# Patient Record
Sex: MALE | Race: WHITE | NOT HISPANIC OR LATINO | Employment: FULL TIME | URBAN - METROPOLITAN AREA
[De-identification: names, ages, dates, MRNs, and addresses within clinical notes are randomized per-mention and may not be internally consistent; named-entity substitution may affect disease eponyms.]

---

## 2018-08-22 ENCOUNTER — OFFICE VISIT (OUTPATIENT)
Dept: NEPHROLOGY | Facility: CLINIC | Age: 33
End: 2018-08-22
Payer: COMMERCIAL

## 2018-08-22 VITALS
DIASTOLIC BLOOD PRESSURE: 104 MMHG | SYSTOLIC BLOOD PRESSURE: 138 MMHG | HEIGHT: 75 IN | HEART RATE: 80 BPM | RESPIRATION RATE: 16 BRPM | BODY MASS INDEX: 28.99 KG/M2 | WEIGHT: 233.13 LBS

## 2018-08-22 DIAGNOSIS — Q61.3 POLYCYSTIC KIDNEY DISEASE: ICD-10-CM

## 2018-08-22 DIAGNOSIS — N20.0 URIC ACID KIDNEY STONE: ICD-10-CM

## 2018-08-22 DIAGNOSIS — I15.1 HYPERTENSION SECONDARY TO OTHER RENAL DISORDERS: Primary | ICD-10-CM

## 2018-08-22 DIAGNOSIS — N28.89 HYPERTENSION SECONDARY TO OTHER RENAL DISORDERS: Primary | ICD-10-CM

## 2018-08-22 PROCEDURE — 99244 OFF/OP CNSLTJ NEW/EST MOD 40: CPT | Performed by: INTERNAL MEDICINE

## 2018-08-22 RX ORDER — IBUPROFEN 200 MG
400 TABLET ORAL EVERY 4 HOURS PRN
COMMUNITY
End: 2018-08-22

## 2018-08-22 RX ORDER — LISINOPRIL 5 MG/1
5 TABLET ORAL DAILY
Qty: 30 TABLET | Refills: 5 | Status: SHIPPED | OUTPATIENT
Start: 2018-08-22 | End: 2018-10-09

## 2018-08-22 NOTE — PATIENT INSTRUCTIONS
AVOID ADVIL, ALEVE, IBUPROFEN, MOTRIN, NAPROSYN, ETC    Kidney Stones   WHAT YOU NEED TO KNOW:   Kidney stones form in the urinary system when the water and waste in your urine are out of balance  When this happens, certain types of waste crystals separate from the urine  The crystals build up and form kidney stones  You may have 1 or more kidney stones  DISCHARGE INSTRUCTIONS:   Return to the emergency department if:   · You have vomiting that is not relieved by medicine  Contact your healthcare provider if:   · You have a fever  · You have trouble passing urine  · You see blood in your urine  · You have severe pain  · You have any questions or concerns about your condition or care  Medicines:   · NSAIDs , such as ibuprofen, help decrease swelling, pain, and fever  This medicine is available with or without a doctor's order  NSAIDs can cause stomach bleeding or kidney problems in certain people  If you take blood thinner medicine, always ask your healthcare provider if NSAIDs are safe for you  Always read the medicine label and follow directions  · Prescription medicine  may be given  Ask how to take this medicine safely  · Medicines  to balance your electrolytes may be needed  · Take your medicine as directed  Contact your healthcare provider if you think your medicine is not helping or if you have side effects  Tell him or her if you are allergic to any medicine  Keep a list of the medicines, vitamins, and herbs you take  Include the amounts, and when and why you take them  Bring the list or the pill bottles to follow-up visits  Carry your medicine list with you in case of an emergency  Follow up with your healthcare provider as directed: You may need to return for more tests  Write down your questions so you remember to ask them during your visits  Self-care:   · Drink plenty of liquids    Your healthcare provider may tell you to drink at least 8 to 12 (eight-ounce) cups of liquids each day  This helps flush out the kidney stones when you urinate  Water is the best liquid to drink  · Strain your urine every time you go to the bathroom  Urinate through a strainer or a piece of thin cloth to catch the stones  Take the stones to your healthcare provider so they can be sent to the lab for tests  This will help your healthcare providers plan the best treatment for you  · Eat a variety of healthy foods  Healthy foods include fruits, vegetables, whole-grain breads, low-fat dairy products, beans, and fish  You may need to limit how much sodium (salt) or protein you eat  Ask for information about the best foods for you  · Stay active  Your stones may pass more easily by if you stay active  Ask about the best activities for you  After you pass your kidney stones:  Once you have passed your kidney stones, your healthcare provider may  order a 24-hour urine test  Results from a 24-hour urine test will help your healthcare provider plan ways to prevent more stones from forming  If you are told to do a 24-hour test, your healthcare provider will give you more instructions  © 2017 [facility] Information is for End User's use only and may not be sold, redistributed or otherwise used for commercial purposes  All illustrations and images included in CareNotes® are the copyrighted property of A [employer] M [employer]  or Angelito Ybarra  The above information is an  only  It is not intended as medical advice for individual conditions or treatments  Talk to your doctor, nurse or pharmacist before following any medical regimen to see if it is safe and effective for you  Low-Sodium Diet   WHAT YOU NEED TO KNOW:   A low-sodium diet limits foods that are high in sodium (salt)  You will need to follow a low-sodium diet if you have high blood pressure, kidney disease, or heart failure   You may also need to follow this diet if you have a condition that is causing your body to retain (hold) extra fluid  You may need to limit the amount of sodium you eat to 1,500 mg  Ask your healthcare provider how much sodium you can have each day  DISCHARGE INSTRUCTIONS:   How to use food labels to choose foods that are low in sodium:  Read food labels to find the amount of sodium they contain  The amount of sodium is listed in milligrams (mg)  The % Daily Value (DV) column tells you how much of your daily needs are met by 1 serving of the food for each nutrient listed  Choose foods that have less than 5% of the DV of sodium  These foods are considered low in sodium  Foods that have 20% or more of the DV of sodium are considered high in sodium  Some food labels may also list any of the following terms that tell you about the sodium content in the food:  · Sodium-free:  Less than 5 mg in each serving    · Very low sodium:  35 mg of sodium or less in each serving    · Low sodium:  140 mg of sodium or less in each serving    · Reduced sodium: At least 25% less sodium in each serving than the regular type    · Light in sodium:  50% less sodium in each serving    · Unsalted or no added salt:  No extra salt is added during processing (the food may still contain sodium)  Foods to avoid:  Salty foods are high in sodium   You should avoid the following:  · Processed foods:      ¨ Mixes for cornbread, biscuits, cake, and pudding     ¨ Instant foods, such as potatoes, cereals, noodles, and rice     ¨ Packaged foods, such as bread stuffing, rice and pasta mixes, snack dip mixes, and macaroni and cheese     ¨ Canned foods, such as canned vegetables, soups, broths, sauces, and vegetable or tomato juice    ¨ Snack foods, such as salted chips, popcorn, pretzels, pork rinds, salted crackers, and salted nuts    ¨ Frozen foods, such as dinners, entrees, vegetables with sauces, and breaded meats    ¨ Sauerkraut, pickled vegetables, and other foods prepared in brine    · Meats and cheeses: ¨ Smoked or cured meat, such as corned beef, georges, ham, hot dogs, and sausage    ¨ Canned meats or spreads, such as potted meats, sardines, anchovies, and imitation seafood    ¨ Deli or lunch meats, such as bologna, ham, turkey, and roast beef    ¨ Processed cheese, such as American cheese and cheese spreads    · Condiments, sauces, and seasonings:      ¨ Salt (¼ teaspoon of salt contains 575 mg of sodium)    ¨ Seasonings made with salt, such as garlic salt, celery salt, onion salt, and seasoned salt    ¨ Regular soy sauce, barbecue sauce, teriyaki sauce, steak sauce, Worcestershire sauce, and most flavored vinegars    ¨ Canned gravy and mixes     ¨ Regular condiments, such as mustard, ketchup, and salad dressings    ¨ Pickles and olives    ¨ Meat tenderizers and monosodium glutamate (MSG)  Foods to include:  Read the food label to find the amount of sodium in each serving  · Bread and cereal:  Try to choose breads with less than 80 mg of sodium per serving  ¨ Bread, roll, rory, tortilla, or unsalted crackers  ¨ Ready-to-eat cereals with less than 5% DV of sodium (examples include shredded wheat and puffed rice)    ¨ Pasta    · Vegetables and fruits:      ¨ Unsalted fresh, frozen, or canned vegetables    ¨ Fresh, frozen, or canned fruits    ¨ Fruit juice    · Dairy:  One serving has about 150 mg of sodium  ¨ Milk, all types    ¨ Yogurt    ¨ Hard cheese, such as cheddar, Swiss, [employer], or mozzarella    · Meat and other protein foods:  Some raw meats may have added sodium  ¨ Plain meats, fish, and poultry     ¨ Egg    · Other foods:      ¨ Homemade pudding    ¨ Unsalted nuts, popcorn, or pretzels    ¨ Unsalted butter or margarine  Ways to decrease sodium:   · Add spices and herbs to foods instead of salt during cooking  Use salt-free seasonings to add flavor to foods  Examples include onion powder, garlic powder, basil, wilson powder, paprika, and parsley   Try lemon or lime juice or vinegar to give foods a tart flavor  Use hot peppers, pepper, or cayenne pepper to add a spicy flavor to foods  · Do not keep a salt shaker at your kitchen table  This may help keep you from adding salt to food at the table  It may take time to get used to enjoying the natural flavor of food instead of adding salt  Talk to your healthcare provider before you use salt substitutes  Some salt substitutes have a high amount of potassium and need to be avoided if you have kidney disease  · Choose low-sodium foods at restaurants  Meals from restaurants are often high in sodium  Some restaurants have nutrition information on the menu that tells you the amount of sodium in their foods  If possible, ask for your food to be prepared with less, or no salt  · Shop for unsalted or low-sodium foods and snacks at the grocery store  Examples include unsalted or low-sodium broths, soups, and canned vegetables  Choose fresh or frozen vegetables instead  Choose unsalted nuts or seeds or fresh fruits or vegetables as snacks  Read food labels and choose salt-free, very low-sodium, or low-sodium foods  © 2017 [address] [facility] Information is for End User's use only and may not be sold, redistributed or otherwise used for commercial purposes  All illustrations and images included in CareNotes® are the copyrighted property of [employer]  or Angelito Ybarra  The above information is an  only  It is not intended as medical advice for individual conditions or treatments  Talk to your doctor, nurse or pharmacist before following any medical regimen to see if it is safe and effective for you

## 2018-08-22 NOTE — PROGRESS NOTES
NEPHROLOGY OUTPATIENT CONSULTATION   Mery Snell 26 y o  male MRN: [id number]  Date: 8/22/2018  Reason for consultation:   Chief Complaint   Patient presents with    Consult    Nephrolithiasis    Hypertension     ASSESSMENT AND PLAN:  Nephrolithiasis  -last stone composition available in 2015 showed 20 percent calcium oxalate, 80 percent uric acid stones  -patient has not had any recent 24 hour urine stone risk profile  Will obtain one before next visit   -check BMP, serum uric acid, phosphorus, PTH, calcium level before next visit  -check urinalysis with microscopy  -patient denies having any history of gout  He does not usually follow with any physicians lately  -he has been having occasional back pain  He recently spontaneously passed stone   -strongly advised to drink plenty of free water with goal urine output greater than 2 liter per day  He admits not to be drinking enough liquid   -salt restricted diet  -based on 24 hour urine stone risk profile results, will make further recommendations   -goal urine pH greater than 6-6 5  -may consider starting potassium citrate depending on urine pH, urine citrate level  -given underlying polycystic kidney disease, he remains at higher risk of kidney stones  -previous CT scan 2016 showed left ureteral calculus, additional nonobstructing intrarenal stones bilaterally and 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  Polycystic kidney disease  -suspected ADPKD based on family history, presence of liver cyst on CT scan, numerous renal cyst bilaterally although no genetic testing has been done as per patient  -patient has strong family history including mother, uncle and grandfather all having polycystic kidney disease  His great uncle was on dialysis  -renal ultrasound from 2012 showed right kidney 17 3 centimeter, left kidney 20 centimeter, numerous bilateral renal cyst  -last serum creatinine 1 08 in September 2017    His baseline creatinine seems to be 0 9 to 1  -continue to closely monitor renal function  -strongly recommended to avoid Advil and any NSAIDs  Currently patient takes 4 to 6 pills of Advil daily for many years  -urinalysis with microscopy before next visit as above  Hypertension  -patient has not been following with any physicians lately  He was prescribed antihypertensive medication by nephrologist at [facility]-6 years ago as per patient although he could not tolerate due to side effect of nausea  He does not remember the name of medication  -he has not been taking any antihypertensive medications lately  -he does not get his blood pressure checked anywhere  -today's blood pressure remains above goal in the office and uncontrolled   -given history of polycystic kidney disease, will start patient on lisinopril 5 mg p o  daily I had detailed conversation and discussion regarding risk of worsening renal failure with continued use of NSAIDs along with lisinopril   -strongly recommended to discontinue NSAIDs which could be contributing to elevated BP as well  -he verbalized understanding of my discussion with him   -salt restricted diet  -recommended to get blood pressure machine at home    HISTORY OF PRESENT ILLNESS:  Gilmar Guerrero is a 26y o  year old male with medical issues of nephrolithiasis for many years, hypertension, polycystic kidney disease who presents for initial consultation for nephrolithiasis  Old medical records were reviewed  Patient's baseline serum creatinine seems to be 0 9 to 1  Patient had 1st episode of nephrolithiasis at the age of 19 and since then he has been frequently spontaneously passing renal stones  His stone analysis in 2015 showed 20 percent calcium oxalate and 80 percent uric acid stones  He recently spontaneously passed stone a week ago  He denies any stone flare-up requiring a ER visit lately  As per patient, he required ER visit early last year    He has been having occasional back pain issues although denies any dysuria or gross hematuria, urgency or hesitancy recently  He takes 4 to 6 tablets of Advil daily for many years due to multiple joint pain due to his work  He admits not to be drinking enough liquid  Patient also has polycystic kidney disease and suspected to have adult polycystic kidney disease based on his strong family history, presents of liver cyst and numerous cyst on both kidneys with enlarged kidneys  Family history includes mother, uncle, grandfather having polycystic kidney disease  As per patient's knowledge, no genetic testing has been done  His great uncle was on dialysis although exact nature of kidney disease is unknown  REVIEW OF SYSTEMS:    Review of Systems   Constitutional: Negative for chills, fatigue and fever  HENT: Negative for congestion, ear pain and postnasal drip  Eyes: Negative for redness and visual disturbance  Respiratory: Negative for cough and shortness of breath  Cardiovascular: Negative for chest pain and leg swelling  Gastrointestinal: Negative for abdominal pain, diarrhea, nausea and vomiting  Endocrine: Negative for polyuria  Genitourinary: Negative for decreased urine volume, difficulty urinating, dysuria, frequency, hematuria and urgency  Musculoskeletal: Positive for arthralgias and back pain  Skin: Negative for rash  Neurological: Positive for headaches (occasional)  Negative for dizziness and light-headedness  Hematological: Does not bruise/bleed easily  Psychiatric/Behavioral: Negative for confusion  The patient is not nervous/anxious  More than 10 point review of systems were obtained and discussed in length with the patient  Complete review of systems were negative / unremarkable except mentioned in the HPI section        PHYSICAL EXAM:  Vitals:    08/22/18 1437 08/22/18 1525   BP: (!) 166/120 (!) 138/104   BP Location: Left arm    Patient Position: Sitting    Cuff Size: Standard    Pulse: 80    Resp: 16    Weight: 106 kg (233 lb 2 oz)    Height: 6' 3" (1 905 m)      Body mass index is 29 14 kg/m²  Physical Exam   Constitutional: He is oriented to person, place, and time  He appears well-developed and well-nourished  HENT:   Head: Normocephalic and atraumatic  Right Ear: External ear normal    Left Ear: External ear normal    Nose: Nose normal    Eyes: Conjunctivae and EOM are normal  Pupils are equal, round, and reactive to light  No scleral icterus  Neck: Neck supple  No JVD present  Cardiovascular: Normal rate and normal heart sounds  Pulmonary/Chest: Effort normal and breath sounds normal  No respiratory distress  He has no wheezes  He has no rales  Abdominal: Soft  Bowel sounds are normal  He exhibits no distension  There is no tenderness  Musculoskeletal: He exhibits no edema or tenderness  Neurological: He is alert and oriented to person, place, and time  Skin: Skin is warm and dry  Psychiatric: He has a normal mood and affect  His behavior is normal    Vitals reviewed        PAST MEDICAL HISTORY:  Past Medical History:   Diagnosis Date    Kidney stone     Polycystic kidney disease        PAST SURGICAL HISTORY:  Past Surgical History:   Procedure Laterality Date    HERNIA REPAIR Left     KIDNEY STONE SURGERY      LITHOTRIPSY         ALLERGIES:  Allergies   Allergen Reactions    Sulfa Antibiotics Other (See Comments)       SOCIAL HISTORY:  History   Alcohol Use    8 4 oz/week    14 Shots of liquor per week     History   Drug Use No     History   Smoking Status    Current Every Day Smoker    Packs/day: 0 50    Types: Cigarettes    Start date: 8/22/1995   Smokeless Tobacco    Never Used       FAMILY HISTORY:  Family History   Problem Relation Age of Onset    Polycystic kidney disease Mother     Hypertension Father     Heart disease Father     Polycystic kidney disease Maternal Uncle        MEDICATIONS:    Current Outpatient Prescriptions:     lisinopril (ZESTRIL) 5 mg tablet, Take 1 tablet (5 mg total) by mouth daily, Disp: 30 tablet, Rfl: 5    Lab Results:   Serum creatinine 1 08 on 9/28/17  Portions of the record may have been created with voice recognition software  Occasional wrong word or "sound a like" substitutions may have occurred due to the inherent limitations of voice recognition software  Read the chart carefully and recognize, using context, where substitutions have occurred

## 2018-09-16 ENCOUNTER — TELEPHONE (OUTPATIENT)
Dept: OTHER | Facility: HOSPITAL | Age: 33
End: 2018-09-16

## 2018-09-16 DIAGNOSIS — R82.991 HYPOCITRATURIA: Primary | ICD-10-CM

## 2018-09-16 RX ORDER — POTASSIUM CITRATE 15 MEQ/1
1 TABLET, EXTENDED RELEASE ORAL 2 TIMES DAILY
Qty: 60 TABLET | Refills: 5 | Status: SHIPPED | OUTPATIENT
Start: 2018-09-16

## 2018-09-16 NOTE — TELEPHONE ENCOUNTER
Can you please let him know that all blood test seem ok  24 hour urine test shows good urine volume at goal and continue drinking plenty of free water  Also continue low salt diet  His blood uric acid level is ok  Urine citrate level is low and I have prescribed potassium citrate 15 meq BID to [employer]  While he is on potassium citrate supplement, he should continue low potassium diet as he is also on lisinopril  He should do repeat BMP in 6 weeks

## 2018-09-24 ENCOUNTER — TELEPHONE (OUTPATIENT)
Dept: NEPHROLOGY | Facility: CLINIC | Age: 33
End: 2018-09-24

## 2018-09-24 DIAGNOSIS — Q61.3 POLYCYSTIC KIDNEY DISEASE: Primary | ICD-10-CM

## 2018-10-09 ENCOUNTER — OFFICE VISIT (OUTPATIENT)
Dept: NEPHROLOGY | Facility: CLINIC | Age: 33
End: 2018-10-09
Payer: COMMERCIAL

## 2018-10-09 VITALS
SYSTOLIC BLOOD PRESSURE: 152 MMHG | HEIGHT: 75 IN | WEIGHT: 233.38 LBS | DIASTOLIC BLOOD PRESSURE: 106 MMHG | BODY MASS INDEX: 29.02 KG/M2 | RESPIRATION RATE: 16 BRPM | HEART RATE: 68 BPM

## 2018-10-09 DIAGNOSIS — N28.89 HYPERTENSION SECONDARY TO OTHER RENAL DISORDERS: ICD-10-CM

## 2018-10-09 DIAGNOSIS — Q61.3 POLYCYSTIC KIDNEY DISEASE: Primary | ICD-10-CM

## 2018-10-09 DIAGNOSIS — N20.0 URIC ACID KIDNEY STONE: ICD-10-CM

## 2018-10-09 DIAGNOSIS — I15.1 HYPERTENSION SECONDARY TO OTHER RENAL DISORDERS: ICD-10-CM

## 2018-10-09 PROCEDURE — 99214 OFFICE O/P EST MOD 30 MIN: CPT | Performed by: INTERNAL MEDICINE

## 2018-10-09 RX ORDER — ACETAMINOPHEN 500 MG
500 TABLET ORAL EVERY 6 HOURS PRN
COMMUNITY

## 2018-10-09 RX ORDER — LOSARTAN POTASSIUM 25 MG/1
25 TABLET ORAL DAILY
Qty: 30 TABLET | Refills: 4 | Status: SHIPPED | OUTPATIENT
Start: 2018-10-09

## 2018-10-09 NOTE — PROGRESS NOTES
NEPHROLOGY OUTPATIENT PROGRESS NOTE   Marbella Carpenter 26 y o  male MRN: [id number]  DATE: 10/9/2018  Reason for visit:   Chief Complaint   Patient presents with    Follow-up    Nephrolithiasis    Hypertension     ASSESSMENT and PLAN:  Nephrolithiasis  -last stone composition available in 2015 showed 20 percent calcium oxalate, 80 percent uric acid stones  -24 hour urine stone risk profile in September 2018 shows adequate urine volume, urine sodium, urine calcium at goal, urine citric acid level low, urine pH 5 5   -since then patient was started on potassium citrate 15 mEq one tablet p  o  b i d  although patient admits to be taking it only daily  -bicarb level, 25, PTH level 25, calcium level normal    Serum uric acid 5 9  -urinalysis showed microscopic hematuria, no proteinuria in September 2018   -patient denies having any history of gout  He does not usually follow with any physicians  -denies passing any spontaneous stone since last visit  -strongly advised to drink plenty of free water with goal urine output greater than 2 liter per day    -salt restricted diet  -goal urine pH greater than 6-6 5  -given underlying polycystic kidney disease, he remains at higher risk of kidney stones  -previous CT scan 2016 showed left ureteral calculus, additional nonobstructing intrarenal stones bilaterally and 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      Polycystic kidney disease  -suspected ADPKD based on family history, presence of liver cyst on CT scan, numerous renal cyst bilaterally although no genetic testing has been done as per patient  -patient has strong family history including mother, uncle and grandfather all having polycystic kidney disease  His great uncle was on dialysis  -renal ultrasound from 2012 showed right kidney 17 3 centimeter, left kidney 20 centimeter, numerous bilateral renal cyst  -last serum creatinine 1 08 in September 2018    His baseline creatinine seems to be 0 9 to 1  -continue to closely monitor renal function  -patient's stopped taking Advil and now remains off NSAIDs       Hypertension  -patient was started on lisinopril during last visit although due to having lightheadedness/dizziness, he stopped taking lisinopril on his own  Currently does not take any antihypertensive medication  He has not informed us about stopping medication  He does not check blood pressure at home    -today's blood pressure remains above goal in the office and uncontrolled  -I will start patient on losartan 25 mg p o  daily  Strongly advised to get blood pressure machine at home and check daily and call back in seven days  If blood pressure remains persistently greater than 140/90, may need to increase losartan further  Now remains off NSAIDs  -salt restricted diet   -also advised to bring BP machine during next visit  Diagnoses and all orders for this visit:    Polycystic kidney disease    Uric acid kidney stone  -     Stone risk profile; Future  -     Urinalysis with reflex to microscopic; Future  -     Uric acid; Future    Hypertension secondary to other renal disorders  -     losartan (COZAAR) 25 mg tablet; Take 1 tablet (25 mg total) by mouth daily  -     Basic metabolic panel; Future  -     Phosphorus; Future  -     Protein / creatinine ratio, urine; Future  -     Urinalysis with reflex to microscopic; Future  -     Uric acid; Future    Other orders  -     acetaminophen (TYLENOL) 500 mg tablet; Take 500 mg by mouth every 6 (six) hours as needed for mild pain          SUBJECTIVE / HPI:  Akbar Zarate is a 26y o  year old male with medical issues of nephrolithiasis for many years, hypertension, polycystic kidney disease who presents for regular follow-up for nephrolithiasis  Patient's baseline serum creatinine seems to be 0 9 to 1  Patient had 1st episode of nephrolithiasis at the age of 19 and since then he has been frequently spontaneously passing renal stones    His stone analysis in 2015 showed 20 percent calcium oxalate and 80 percent uric acid stones  Since last visit, denies passing any stone spontaneously  Denies any flank pain issues  Recently he was having penile pain but denies any gross hematuria  He is not sure whether this was related to his nephrolithiasis  This has spontaneously resolved  Denies any urinary complaint  Patient has significant history of taking 4 to 6 tablets of Advil daily for many years although since last visit, his stop taking Advil    Patient also has polycystic kidney disease and suspected to have adult polycystic kidney disease based on his strong family history, presents of liver cyst and numerous cyst on both kidneys with enlarged kidneys      Family history includes mother, uncle, grandfather having polycystic kidney disease  As per patient's knowledge, no genetic testing has been done  His great uncle was on dialysis although exact nature of kidney disease is unknown  REVIEW OF SYSTEMS:  More than 10 point review of systems were obtained and discussed in length with the patient  Complete review of systems were negative / unremarkable except mentioned above  PHYSICAL EXAM:  Vitals:    10/09/18 1557 10/09/18 1619   BP: (!) 162/114 (!) 152/106   BP Location: Left arm    Patient Position: Sitting    Cuff Size: Standard    Pulse: 68    Resp: 16    Weight: 106 kg (233 lb 6 oz)    Height: 6' 3" (1 905 m)      Body mass index is 29 17 kg/m²  Physical Exam   Constitutional: He is oriented to person, place, and time  He appears well-developed and well-nourished  HENT:   Head: Normocephalic and atraumatic  Right Ear: External ear normal    Left Ear: External ear normal    Nose: Nose normal    Eyes: Pupils are equal, round, and reactive to light  Conjunctivae and EOM are normal  No scleral icterus  Neck: Neck supple  No JVD present  Cardiovascular: Normal rate and normal heart sounds      Pulmonary/Chest: Effort normal and breath sounds normal  No respiratory distress  He has no wheezes  He has no rales  Abdominal: Soft  Bowel sounds are normal  He exhibits no distension  There is no tenderness  Musculoskeletal: He exhibits no edema or tenderness  Neurological: He is alert and oriented to person, place, and time  Skin: Skin is warm and dry  Psychiatric: He has a normal mood and affect  His behavior is normal    Vitals reviewed  PAST MEDICAL HISTORY:  Past Medical History:   Diagnosis Date    Kidney stone     Polycystic kidney disease        PAST SURGICAL HISTORY:  Past Surgical History:   Procedure Laterality Date    HERNIA REPAIR Left     KIDNEY STONE SURGERY      LITHOTRIPSY         SOCIAL HISTORY:  History   Alcohol Use    8 4 oz/week    14 Shots of liquor per week     History   Drug Use No     History   Smoking Status    Current Every Day Smoker    Packs/day: 0 50    Types: Cigarettes    Start date: 8/22/1995   Smokeless Tobacco    Never Used       FAMILY HISTORY:  Family History   Problem Relation Age of Onset    Polycystic kidney disease Mother     Hypertension Father     Heart disease Father     Polycystic kidney disease Maternal Uncle        MEDICATIONS:    Current Outpatient Prescriptions:     acetaminophen (TYLENOL) 500 mg tablet, Take 500 mg by mouth every 6 (six) hours as needed for mild pain, Disp: , Rfl:     Potassium Citrate ER 15 MEQ (1620 MG) TBCR, Take 1 tablet by mouth 2 (two) times a day, Disp: 60 tablet, Rfl: 5    losartan (COZAAR) 25 mg tablet, Take 1 tablet (25 mg total) by mouth daily, Disp: 30 tablet, Rfl: 4    Lab Results:    Serum creatinine 1 0 in September 2018

## 2018-10-09 NOTE — LETTER
October 9, 2018     No Recipients    Patient: Clifford Vasques   YOB: 1985   Date of Visit: 10/9/2018       Dear Dr Lala Odonnell Recipients: Thank you for referring Clifford Vasques to me for evaluation  Below are my notes for this consultation  If you have questions, please do not hesitate to call me  I look forward to following your patient along with you  Sincerely,        Jose M Villela MD        CC: No Recipients  Jose M Villela MD  10/9/2018  4:43 PM  Sign at close encounter  [address] [place] [address] 26 y o  male MRN: [id number]  DATE: 10/9/2018  Reason for visit:   Chief Complaint   Patient presents with    Follow-up    Nephrolithiasis    Hypertension     ASSESSMENT and PLAN:  Nephrolithiasis  -last stone composition available in 2015 showed 20 percent calcium oxalate, 80 percent uric acid stones  -24 hour urine stone risk profile in September 2018 shows adequate urine volume, urine sodium, urine calcium at goal, urine citric acid level low, urine pH 5 5   -since then patient was started on potassium citrate 15 mEq one tablet p  o  b i d  although patient admits to be taking it only daily  -bicarb level, 25, PTH level 25, calcium level normal    Serum uric acid 5 9  -urinalysis showed microscopic hematuria, no proteinuria in September 2018   -patient denies having any history of gout  He does not usually follow with any physicians  -denies passing any spontaneous stone since last visit  -strongly advised to drink plenty of free water with goal urine output greater than 2 liter per day    -salt restricted diet  -goal urine pH greater than 6-6 5  -given underlying polycystic kidney disease, he remains at higher risk of kidney stones    -previous CT scan 2016 showed left ureteral calculus, additional nonobstructing intrarenal stones bilaterally and 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      Polycystic kidney disease  -suspected ADPKD based on family history, presence of liver cyst on CT scan, numerous renal cyst bilaterally although no genetic testing has been done as per patient  -patient has strong family history including mother, uncle and grandfather all having polycystic kidney disease  His great uncle was on dialysis  -renal ultrasound from 2012 showed right kidney 17 3 centimeter, left kidney 20 centimeter, numerous bilateral renal cyst  -last serum creatinine 1 08 in September 2018  His baseline creatinine seems to be 0 9 to 1  -continue to closely monitor renal function  -patient's stopped taking Advil and now remains off NSAIDs       Hypertension  -patient was started on lisinopril during last visit although due to having lightheadedness/dizziness, he stopped taking lisinopril on his own  Currently does not take any antihypertensive medication  He has not informed us about stopping medication  He does not check blood pressure at home    -today's blood pressure remains above goal in the office and uncontrolled  -I will start patient on losartan 25 mg p o  daily  Strongly advised to get blood pressure machine at home and check daily and call back in seven days  If blood pressure remains persistently greater than 140/90, may need to increase losartan further  Now remains off NSAIDs  -salt restricted diet   -also advised to bring BP machine during next visit  Diagnoses and all orders for this visit:    Polycystic kidney disease    Uric acid kidney stone  -     Stone risk profile; Future  -     Urinalysis with reflex to microscopic; Future  -     Uric acid; Future    Hypertension secondary to other renal disorders  -     losartan (COZAAR) 25 mg tablet; Take 1 tablet (25 mg total) by mouth daily  -     Basic metabolic panel; Future  -     Phosphorus; Future  -     Protein / creatinine ratio, urine; Future  -     Urinalysis with reflex to microscopic; Future  -     Uric acid; Future    Other orders  -     acetaminophen (TYLENOL) 500 mg tablet;  Take 500 mg by mouth every 6 (six) hours as needed for mild pain          SUBJECTIVE / HPI:  Milly Zapata is a 26y o  year old male with medical issues of nephrolithiasis for many years, hypertension, polycystic kidney disease who presents for regular follow-up for nephrolithiasis  Patient's baseline serum creatinine seems to be 0 9 to 1  Patient had 1st episode of nephrolithiasis at the age of 19 and since then he has been frequently spontaneously passing renal stones  His stone analysis in 2015 showed 20 percent calcium oxalate and 80 percent uric acid stones  Since last visit, denies passing any stone spontaneously  Denies any flank pain issues  Recently he was having penile pain but denies any gross hematuria  He is not sure whether this was related to his nephrolithiasis  This has spontaneously resolved  Denies any urinary complaint  Patient has significant history of taking 4 to 6 tablets of Advil daily for many years although since last visit, his stop taking Advil    Patient also has polycystic kidney disease and suspected to have adult polycystic kidney disease based on his strong family history, presents of liver cyst and numerous cyst on both kidneys with enlarged kidneys      Family history includes mother, uncle, grandfather having polycystic kidney disease  As per patient's knowledge, no genetic testing has been done  His great uncle was on dialysis although exact nature of kidney disease is unknown  REVIEW OF SYSTEMS:  More than 10 point review of systems were obtained and discussed in length with the patient  Complete review of systems were negative / unremarkable except mentioned above  PHYSICAL EXAM:  Vitals:    10/09/18 1557 10/09/18 1619   BP: (!) 162/114 (!) 152/106   BP Location: Left arm    Patient Position: Sitting    Cuff Size: Standard    Pulse: 68    Resp: 16    Weight: 106 kg (233 lb 6 oz)    Height: 6' 3" (1 905 m)      Body mass index is 29 17 kg/m²      Physical Exam Constitutional: He is oriented to person, place, and time  He appears well-developed and well-nourished  HENT:   Head: Normocephalic and atraumatic  Right Ear: External ear normal    Left Ear: External ear normal    Nose: Nose normal    Eyes: Pupils are equal, round, and reactive to light  Conjunctivae and EOM are normal  No scleral icterus  Neck: Neck supple  No JVD present  Cardiovascular: Normal rate and normal heart sounds  Pulmonary/Chest: Effort normal and breath sounds normal  No respiratory distress  He has no wheezes  He has no rales  Abdominal: Soft  Bowel sounds are normal  He exhibits no distension  There is no tenderness  Musculoskeletal: He exhibits no edema or tenderness  Neurological: He is alert and oriented to person, place, and time  Skin: Skin is warm and dry  Psychiatric: He has a normal mood and affect  His behavior is normal    Vitals reviewed        PAST MEDICAL HISTORY:  Past Medical History:   Diagnosis Date    Kidney stone     Polycystic kidney disease        PAST SURGICAL HISTORY:  Past Surgical History:   Procedure Laterality Date    HERNIA REPAIR Left     KIDNEY STONE SURGERY      LITHOTRIPSY         SOCIAL HISTORY:  History   Alcohol Use    8 4 oz/week    14 Shots of liquor per week     History   Drug Use No     History   Smoking Status    Current Every Day Smoker    Packs/day: 0 50    Types: Cigarettes    Start date: 8/22/1995   Smokeless Tobacco    Never Used       FAMILY HISTORY:  Family History   Problem Relation Age of Onset    Polycystic kidney disease Mother     Hypertension Father     Heart disease Father     Polycystic kidney disease Maternal Uncle        MEDICATIONS:    Current Outpatient Prescriptions:     acetaminophen (TYLENOL) 500 mg tablet, Take 500 mg by mouth every 6 (six) hours as needed for mild pain, Disp: , Rfl:     Potassium Citrate ER 15 MEQ (1620 MG) TBCR, Take 1 tablet by mouth 2 (two) times a day, Disp: 60 tablet, Rfl: 5    losartan (COZAAR) 25 mg tablet, Take 1 tablet (25 mg total) by mouth daily, Disp: 30 tablet, Rfl: 4    Lab Results:    Serum creatinine 1 0 in September 2018